# Patient Record
Sex: MALE | Race: WHITE | NOT HISPANIC OR LATINO | Employment: FULL TIME | URBAN - METROPOLITAN AREA
[De-identification: names, ages, dates, MRNs, and addresses within clinical notes are randomized per-mention and may not be internally consistent; named-entity substitution may affect disease eponyms.]

---

## 2022-05-16 ENCOUNTER — OFFICE VISIT (OUTPATIENT)
Dept: ORTHOPEDIC SURGERY | Facility: CLINIC | Age: 52
End: 2022-05-16

## 2022-05-16 VITALS
BODY MASS INDEX: 25.48 KG/M2 | HEIGHT: 71 IN | SYSTOLIC BLOOD PRESSURE: 134 MMHG | HEART RATE: 60 BPM | WEIGHT: 182 LBS | DIASTOLIC BLOOD PRESSURE: 81 MMHG

## 2022-05-16 DIAGNOSIS — R29.898 SHOULDER WEAKNESS: ICD-10-CM

## 2022-05-16 DIAGNOSIS — G89.29 CHRONIC LEFT SHOULDER PAIN: Primary | ICD-10-CM

## 2022-05-16 DIAGNOSIS — M25.512 CHRONIC LEFT SHOULDER PAIN: Primary | ICD-10-CM

## 2022-05-16 DIAGNOSIS — M75.82 ROTATOR CUFF TENDONITIS, LEFT: ICD-10-CM

## 2022-05-16 PROCEDURE — 99203 OFFICE O/P NEW LOW 30 MIN: CPT | Performed by: ORTHOPAEDIC SURGERY

## 2022-05-16 PROCEDURE — 73030 X-RAY EXAM OF SHOULDER: CPT | Performed by: ORTHOPAEDIC SURGERY

## 2022-05-16 RX ORDER — CHLORAL HYDRATE 500 MG
CAPSULE ORAL
COMMUNITY

## 2022-05-16 RX ORDER — CETIRIZINE HYDROCHLORIDE 10 MG/1
TABLET ORAL
COMMUNITY

## 2022-05-16 RX ORDER — BACLOFEN 10 MG/1
TABLET ORAL
COMMUNITY

## 2022-05-16 NOTE — PROGRESS NOTES
"Subjective:     Patient ID: Gen Palomino is a 52 y.o. male.    Chief Complaint:  Left shoulder pain, new patient  History of Present Illness  Gen Palomino presents to clinic today for evaluation of left shoulder pain. The patient is accompanied by an adult female who contributes in his medical history.    Approximately a year ago, he noticed he was having trouble with his left shoulder,  He rates his pain 6 to 7 out of 10. He localized his pain to the lateral aspect of his left shoulder. He denies any known injury. The patient reports weakness and soreness at the end of the day. He denies any numbness, tingling, or radiating pain. His pain is exacerbated with overhead motion. He denies any history of popping out of place. The patient denies any alleviating factors. He is right hand dominate.    The patient reports history of right shoulder rotator cuff tear in 2015.     Social History     Occupational History   • Not on file   Tobacco Use   • Smoking status: Never Smoker   • Smokeless tobacco: Never Used   Vaping Use   • Vaping Use: Never used   Substance and Sexual Activity   • Alcohol use: Yes     Comment: 3-4 drinks a week   • Drug use: Never   • Sexual activity: Defer      Past Medical History:   Diagnosis Date   • Back pain      Past Surgical History:   Procedure Laterality Date   • BACK SURGERY     • COLONOSCOPY     • SHOULDER SURGERY Right 11/23/2015       History reviewed. No pertinent family history.      Review of Systems        Objective:  Vitals:    05/16/22 1506   BP: 134/81   Pulse: 60   Weight: 82.6 kg (182 lb)   Height: 180.3 cm (71\")         05/16/22  1506   Weight: 82.6 kg (182 lb)     Body mass index is 25.38 kg/m².  Physical Exam    Vital signs reviewed.   General: No acute distress, alert and oriented  Eyes: conjunctiva clear; pupils equally round and reactive  ENT: external ears and nose atraumatic; oropharynx clear  CV: no peripheral edema  Resp: normal respiratory effort  Skin: no rashes " or wounds; normal turgor  Psych: mood and affect appropriate; recent and remote memory intact          Ortho Exam     Left shoulder-  FF- Active- 180  Strength- 4-/5  ER- Active- 40  Resisted ER 3/5  Belly press - 4+/5.  ER - 45  Negative varus/valgus  Empty can test- Positive  Neer's sign- Negative  Kitchen- Negative  Yergason- Mildly positive  Speeds- Mildly positive  Cochise's- Equivocal  Brisk cap refill to all digits, palpable 2+ radial pulse  Positive sensation to light touch palmar, dorsal aspects of small and index fingers and anatomic snuffbox left hand    Imaging:  Left Shoulder X-Ray  Indication: Pain  AP, scapular Y, and axillary lateral views    Findings:  No fracture  No bony lesion  Normal soft tissues  Normal joint spaces    No prior studies were available for comparison.    Assessment:        1. Chronic left shoulder pain    2. Shoulder weakness    3. Rotator cuff tendonitis, left           Plan:          1. Discussed treatment options at length with patient at today's visit.  2. At this point in time, he does have significant weakness to his left shoulder. It has been progressive over the last 8 to 12 months, and given the fact that it is causing him some limitations with pain, which he rates as a 6 to 7 out of 10, we discussed options. He would like to proceed with MRI at this point in time to evaluate for rotator cuff pathology.  3. I will plan on contacting him over the phone once this is completed so we can review results and discuss further treatment options at that time.      Gen Palomino was in agreement with plan and had all questions answered.     Orders:  Orders Placed This Encounter   Procedures   • XR Shoulder 2+ View Left   • MRI Shoulder Left Without Contrast       Medications:  No orders of the defined types were placed in this encounter.      Followup:  Return for review of MRI results.    Diagnoses and all orders for this visit:    1. Chronic left shoulder pain (Primary)  -      XR Shoulder 2+ View Left    2. Shoulder weakness  -     MRI Shoulder Left Without Contrast; Future    3. Rotator cuff tendonitis, left  -     MRI Shoulder Left Without Contrast; Future          Dictated utilizing Dragon dictation

## 2022-06-10 ENCOUNTER — HOSPITAL ENCOUNTER (OUTPATIENT)
Dept: MRI IMAGING | Facility: HOSPITAL | Age: 52
Discharge: HOME | End: 2022-06-10
Admitting: ORTHOPAEDIC SURGERY

## 2022-06-10 DIAGNOSIS — R29.898 SHOULDER WEAKNESS: ICD-10-CM

## 2022-06-10 DIAGNOSIS — M75.82 ROTATOR CUFF TENDONITIS, LEFT: ICD-10-CM

## 2022-06-10 PROCEDURE — 73221 MRI JOINT UPR EXTREM W/O DYE: CPT

## 2022-06-20 NOTE — PROGRESS NOTES
Discussed results from MRI with patient and reviewed findings including full-thickness tear of the infraspinatus and advanced degenerative change of the AC joint as well as biceps tendinitis.  we did talk about option for arthroscopic treatment with rotator cuff repair given full-thickness nature of the tear.  Patient does feel like this tear likely originated about a year and a half ago and at this point time with his work he is limited in ability to take time off to be limited in a sling and do the appropriate physical therapy.  He wants to hold off on this point time but he may want to proceed with surgical treatment of the fall.  If he does decide to proceed with surgery, we will plan to proceed with left shoulder arthroscopy, rotator cuff repair, possible biceps tenodesis, and distal clavicle excision all questions were answered over the phone.

## 2024-04-30 ENCOUNTER — OFFICE VISIT (OUTPATIENT)
Dept: UROLOGY | Facility: CLINIC | Age: 54
End: 2024-04-30
Payer: COMMERCIAL

## 2024-04-30 VITALS — HEIGHT: 71 IN | WEIGHT: 182 LBS | BODY MASS INDEX: 25.48 KG/M2

## 2024-04-30 DIAGNOSIS — Z30.09 STERILIZATION CONSULT: Primary | ICD-10-CM

## 2024-04-30 NOTE — PROGRESS NOTES
Chief Complaint: Undesired fertility         History of Present Illness:  Gen Palomino is a 53 y.o. male presents for counseling regarding vasectomy for permanent sterilization. The procedure was discussed with the patient. Gen Palomino is aware that the procedure should be considered irreversible, although at times it can be reversed with success. Risks for the procedure including local effects of swelling, bleeding, pain, the possibility for recanalization, and continued fertility is also possible. Formation of a sperm granuloma also is a possibility. We discussed the procedure, preoperative preparation, and postoperative care. We also discussed the importance of continuing to use contraception post vasectomy, since the patient will not be sterile at that point. We stressed the importance of continuing to use contraception until a follow-up semen specimen is done that shows the absence of sperm. That specimen will normally be obtained eight weeks post-surgery. Gen Palomino is voluntarily requesting the procedure and understands that if it is successful he will be unable to father children.     No anticoagulation, no previous scrotal surgery, no cardiopulmonary history    Alert and oriented x3  No acute distress  Unlabored respirations  Nontender/nondistended  Normal circumcised phallus, bilateral descended testicles without mass.  Bilateral vas deferens palpable  Grossly oriented to person place and time judgment is intact      Assessment and Plan      Consult for sterilization      Plan    Instructions  The patient will schedule a vasectomy if he desires.   Handouts were provided, vasectomy  scanned into the EMR for reference.   Vasectomy is intended to be a permanent form of contraception.   Vasectomy does not produce immediate sterility.   Following vasectomy, another form of contraception is required until vas occlusion is confirmed by post-vasectomy semen analysis (PVSA).   Even after vas  occlusion is confirmed, vasectomy is not 100% reliable in preventing pregnancy.   The risk of pregnancy after vasectomy is approximately 1 in 2,000 for men who have no sperm in the semen on post-vasectomy semen analysis showing rare non-motile sperm (RNMS).   Repeat vasectomy is necessary in <1% of vasectomies, provided that a technique for vas occlusion known to have low occlusive failure rate has been used.   Patient's should refrain from ejaculation for approximately 1 week after vasectomy.   Options for fertility after vasectomy reversal and sperm retrieval with in vitro fertilization. These options are not always successful, and are very expensive.   The rates of surgical complications such as symptomatic hematoma and infection are 1-2%  Chronic scrotal pain associated with negative impact on quality of life occurs after vasectomy in about 1-2% of men. Few of these men require additional surgery.   Other permanent and non-permanent alternatives to vasectomy are available.  Patient voiced understanding of the above statements.   Electronically identified patient education materials provided electronically    Patient has decided to schedule a vasectomy.

## 2024-11-08 ENCOUNTER — PROCEDURE VISIT (OUTPATIENT)
Dept: UROLOGY | Age: 54
End: 2024-11-08
Payer: COMMERCIAL

## 2024-11-08 DIAGNOSIS — Z30.2 ENCOUNTER FOR STERILIZATION: Primary | ICD-10-CM

## 2024-11-08 NOTE — PROGRESS NOTES
Vasectomy Procedure    Procedure: Vasectomy     Pre-procedure Diagnosis: Undesired Fertility    Post-procedure Diagnosis: Same    Surgeon: Hunter Ríos MD    Anesthesia: Local, 10 cc of 1% Lidocaine    Indications  with undesired fertility, who presents today for vasectomy for permanent contraception.     Procedure Details:     The patient was appropriately identified, brought into the procedure room, and placed supine on the procedure table. A time was undertaken documenting the correct patient, site and procedure. His scrotum was prepped and draped in the standard sterile fashion. We first approached the right vas deferens. This was identified,  from the spermatic cord structures, and brought to the anterolateral aspect of the hemiscrotum. The local anaesthetic solution was injected and once an adequate level of local anesthesia was achieved, a scalpel was used to make a 1 cm incision in the skin overlying the vas deferens at the midline. A sharp hemostat was used to dissect the level of the vas deferens and on both of its sides, allowing for ring forceps to be introduced and grasp the vas deferens and externalize it through the skin incision. We then used a combination of sharp and blunt dissection to release the exposed vasal segment from all surrounding tissues. An approximately 1 cm piece of vas deferens was then sharply excised and removed. One blade of a sharp hemostat was used to probe each end of the severed vas deferens, confirming the presence of a vasal lumen. Electrocautery was then used to fulgurate both cut surfaces of the severed vas deferens. The abdominal side of the vas deferens was then placed underneath some perivasal tissues that were closed above it, using a figure-of-eight 3-0 chromic stitch, thus placing the two edges of the severed vas deferens in different tissue planes. Hemostasis was confirmed and the severed vas deferens was allowed to drop back into the scrotum.  We then  turned our attention to the left vas deferens. This was also identified and brought under the same midline incision. Local anesthetic was then delivered on this side around the vas deferens. An identical procedure was then carried out on the left vas deferens. Wound was dressed with bacitracin ointment and folded 4x4 gauze. The patient tolerated the procedure well and there were no intraoperative or immediate postoperative complications.     No intraprocedural complications    Blood loss 000    Plan:    1. Continue contraception until negative sperm analysis. Semen count in 10 weeks  2. Warning signs of infection were reviewed.   3. Patient is taken home by  with written home care instructions.  Bedrest X 48 hrs, Ice pack every 3 hours for 24 hrs.    Call the clinic if excessive pain, bleeding or swelling.  Light duty for 2 weeks    Patient voiced understanding.    Electronically Signed by Hunter Ríos MD on 11/08/2024

## 2025-01-15 ENCOUNTER — TELEPHONE (OUTPATIENT)
Dept: UROLOGY | Age: 55
End: 2025-01-15
Payer: COMMERCIAL

## 2025-01-15 NOTE — TELEPHONE ENCOUNTER
----- Message from Joel VILLASENOR sent at 1/15/2025 11:52 AM EST -----  JOSE A HAD TO ADD A SURGERY TO FRIDAY AFTERNOON. WE NEED THIS PT TO COME IN BEFORE 2:30PM PLEASE

## 2025-01-15 NOTE — TELEPHONE ENCOUNTER
PATIENT RETURNED CALL.     INFORMED HIM DR. NARANJO WILL BE LEAVING @ 2:30 ON 1/17. HE WILL NEED TO DROP HIS SPECIMEN SAMPLE OFF PRIOR TO THIS TIME, IF HE GETS HERE ANY LATER, HE MAY HAVE RECOLLECT AND DROP OFF ANOTHER SAMPLE ON A DIFFERENT DAY.    PATIENT VERBALIZED UNDERSTANDING AND STATED HE WOULD BE HERE PRIOR TO 2:30 ON 1/17.

## 2025-01-15 NOTE — TELEPHONE ENCOUNTER
CALLED TO INFORM PATIENT WE NEED HIM TO DROP OF SPECIMEN PRIOR TO 2:30 ON 1/17 DUE TO NARANJO ADDING ON SX IN THE AFTERNOON.    NO ANSWER, LMOM

## 2025-01-17 ENCOUNTER — OFFICE VISIT (OUTPATIENT)
Dept: UROLOGY | Age: 55
End: 2025-01-17
Payer: COMMERCIAL

## 2025-01-17 DIAGNOSIS — Z30.2 ENCOUNTER FOR STERILIZATION: Primary | ICD-10-CM

## 2025-01-17 PROCEDURE — 99024 POSTOP FOLLOW-UP VISIT: CPT | Performed by: UROLOGY

## 2025-01-17 NOTE — PROGRESS NOTES
Subjective         History of Present Illness:  Gen Palomino is a 54 y.o. male who is here status post vasectomy. 0 sperm noted on a #20 power fields.         Assessment and Plan     Undesired fertility    Medications  Medications have been reconciled.       Instructions    [x]   Instructed patient to follow-up as needed, counseled that he is okay to stop using birth control.    []   Patient to follow-up in 1 month for recheck, patient counseled to continue use birth control as he is still considered fertile and able to father children until recheck and given the okay to stop using birth control at that time. Patient voiced understanding.           Hunter Ríos MD